# Patient Record
Sex: MALE | Race: BLACK OR AFRICAN AMERICAN | URBAN - METROPOLITAN AREA
[De-identification: names, ages, dates, MRNs, and addresses within clinical notes are randomized per-mention and may not be internally consistent; named-entity substitution may affect disease eponyms.]

---
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
## 2023-05-01 ENCOUNTER — OFFICE VISIT (OUTPATIENT)
Dept: PEDIATRICS | Facility: CLINIC | Age: 18
End: 2023-05-01
Payer: MEDICAID

## 2023-05-01 VITALS
WEIGHT: 205.69 LBS | TEMPERATURE: 98 F | OXYGEN SATURATION: 100 % | RESPIRATION RATE: 16 BRPM | SYSTOLIC BLOOD PRESSURE: 131 MMHG | HEART RATE: 60 BPM | DIASTOLIC BLOOD PRESSURE: 83 MMHG

## 2023-05-01 DIAGNOSIS — Z23 IMMUNIZATION DUE: ICD-10-CM

## 2023-05-01 DIAGNOSIS — Z00.00 ENCOUNTER FOR WELL ADULT EXAM WITHOUT ABNORMAL FINDINGS: Primary | ICD-10-CM

## 2023-05-01 PROCEDURE — 1159F PR MEDICATION LIST DOCUMENTED IN MEDICAL RECORD: ICD-10-PCS | Mod: CPTII,,, | Performed by: NURSE PRACTITIONER

## 2023-05-01 PROCEDURE — 99214 OFFICE O/P EST MOD 30 MIN: CPT | Mod: PBBFAC,PN | Performed by: NURSE PRACTITIONER

## 2023-05-01 PROCEDURE — 1159F MED LIST DOCD IN RCRD: CPT | Mod: CPTII,,, | Performed by: NURSE PRACTITIONER

## 2023-05-01 PROCEDURE — 99395 PR PREVENTIVE VISIT,EST,18-39: ICD-10-PCS | Mod: S$PBB,,, | Performed by: NURSE PRACTITIONER

## 2023-05-01 PROCEDURE — 3075F SYST BP GE 130 - 139MM HG: CPT | Mod: CPTII,,, | Performed by: NURSE PRACTITIONER

## 2023-05-01 PROCEDURE — 90471 IMMUNIZATION ADMIN: CPT | Mod: PBBFAC,PN

## 2023-05-01 PROCEDURE — 3079F DIAST BP 80-89 MM HG: CPT | Mod: CPTII,,, | Performed by: NURSE PRACTITIONER

## 2023-05-01 PROCEDURE — 1160F PR REVIEW ALL MEDS BY PRESCRIBER/CLIN PHARMACIST DOCUMENTED: ICD-10-PCS | Mod: CPTII,,, | Performed by: NURSE PRACTITIONER

## 2023-05-01 PROCEDURE — 3079F PR MOST RECENT DIASTOLIC BLOOD PRESSURE 80-89 MM HG: ICD-10-PCS | Mod: CPTII,,, | Performed by: NURSE PRACTITIONER

## 2023-05-01 PROCEDURE — 1160F RVW MEDS BY RX/DR IN RCRD: CPT | Mod: CPTII,,, | Performed by: NURSE PRACTITIONER

## 2023-05-01 PROCEDURE — 99395 PREV VISIT EST AGE 18-39: CPT | Mod: S$PBB,,, | Performed by: NURSE PRACTITIONER

## 2023-05-01 PROCEDURE — 3075F PR MOST RECENT SYSTOLIC BLOOD PRESS GE 130-139MM HG: ICD-10-PCS | Mod: CPTII,,, | Performed by: NURSE PRACTITIONER

## 2023-05-01 NOTE — PROGRESS NOTES
Chief Complaint   Patient presents with    Well Child     Here for routine wellness visit.  No problems or illnesses reported.  PHQ-9 given.       HPI:  Ant is here with his younger brother, Dee, for 17 year old wellness visit  Pt has history/diagnosis: no significant history  Takes no medications    Working at [employer] 6 days a week, from 10 - 4  Has plans to enroll in college for this fall. Will major in agriculture    Appetite: good appetite, eats a variety of foods  Drinks: Powerade, water, juice    Sleep pattern: bedtime is 10, up at 6:30am for school. Sleeps well    Family in home: parents, Ant and 2 younger brothers  Pets? no  Favorite activities? playing basketball  Mood: happy  Anxiety/OCD: no    Brushes teeth: 1 in AM times/day and sometimes before bed  Sees dentist regularly? no    Any vision/eye problems? no  Snellen exam today: 20/20 Rt, 20/25 Lt, both: 20/20    Safety:  Wears seat belt every time you ride in car? yes  Can he/she swim? yes  Does family have/practice fire escape plan? yes  Any guns in home? no     Do you have a best friend? no, many friends but no best friend  Do you have a girlfriend or boyfriend? girlfriend  Are you sexually active? no    Have you tried alcohol? no    Have you every smoked or vaped? no    Are you driving? yes    Do you have any career or job you are interested in? would like to work in agriculture    Do you have concerns today? no    Last wellness labs done 2/8/21    Review of Systems   Gen: No fever, fatigue or malaise  Nose: No nasal congestion  Mouth: No sore throat  Resp: No cough or wheezing  CVS: No chest pain or palpitations  GI: No stomach aches  Neuro: No headaches    Vitals:    05/01/23 1228   BP: 131/83   BP Location: Left arm   Patient Position: Sitting   BP Method: Large (Automatic)   Pulse: 60   Resp: 16   Temp: 97.9 °F (36.6 °C)   TempSrc: Temporal   SpO2: 100%   Weight: 93.3 kg (205 lb 11 oz)     Physical Exam  General: Alert, appropriate for  age. Social and cooperative.  Skin: Warm, dry, no rash.  Eye: Pupils are equal, round and reactive to light. Normal conjunctiva, no discharge.  Nose: Turbinates normal. No nasal discharge.  Mouth and throat: Oral mucosa moist, no pharyngeal erythema or exudate.  Neck: Supple. No lymphadenopathy.  Respiratory: Lungs are clear to auscultation, breath sounds are equal, symmetrical chest wall expansion.  Cardiovascular: Regular rate and rhythm. No murmur.  Gastrointestinal: Normal bowel sounds.  Back: Normal alignment.   Musculoskeletal: Moves all extremities. Normal strength, good hand grasps  Neurologic: Alert, no focal neurological deficit observed. Cranial nerves II - XII grossly intact. Normal and symmetrical biceps reflexes observed.  BMI = 35.9    Assessment/Plan:  Encounter for well adult exam without abnormal findings  Comments:  Healthy, overnourished young adult    Immunization due  Comments:  Flu vaccine  Orders:  -     Influenza - Quadrivalent (PF)      Monitor blood pressure periodically. Ask parents if they have high blood pressure, and if it runs in the family  Will get labs at next wellness visit  Follow up 12 months for 18 year wellness visit